# Patient Record
(demographics unavailable — no encounter records)

---

## 2025-07-25 NOTE — IMAGING
[de-identified] : RIGHT KNEE No Effusion +Medial joint line tenderness ROM 3-115 5/5 Strength NVI  LEFT KNEE No Effusion +Medial joint line tenderness ROM 3-120 5/5 Strength NVI  Non-antalgic gait w/o assistance

## 2025-07-25 NOTE — PROCEDURE
[Right] : of the right [Knee] : knee [Effusion] : effusion [de-identified] : 20cc [de-identified] : clear, straw-colored  [FreeTextEntry3] : Large joint injection was performed on the __BILATERAL_ knee. The indication for this procedure was pain, inflammation, and x-ray evidence of Osteoarthritis on this or prior visit. The site was prepped with betadine, ethyl chloride sprayed topically, and sterile technique used. An injection of Lidocaine 3cc of 1%, Bupivacaine (Marcaine) 5cc of 0.25%, Methylprednisolone (Depomedrol) cc of 80 mg was used. Patient was advised to call if redness, pain, or fever occur, apply ice for 15 minutes out of every hour for the next 12-24 hours as tolerated and patient was advised to rest the joint(s) for days. Patient has tried OTC's including aspirin, Ibuprofen, Aleve, etc. or prescription NSAIDS, and/or exercises at home and/or physical therapy without satisfactory response and patient had decreased mobility in the joint. Ultrasound guidance was indicated for this patient due to better visualize joint space. All ultrasound images have been permanently captured and stored accordingly in our picture.

## 2025-07-25 NOTE — DISCUSSION/SUMMARY
[de-identified] : The patient was advised of the diagnosis.  The natural history of the pathology was explained in full to the patient in layman's terms. All questions were answered.  The risks and benefits of surgical and non-surgical treatment alternatives were explained in full to the patient.  The natural progression of Osteoarthritis was explained to the patient.  We discussed the possible treatment options from conservative to operative.  These included NSAIDS, Glucosamine and Chondrotin sulfate, and Physical Therapy as well different types of injections.  We also discussed that at some point they may progress to needed a TKA.  Information and pamphlets were given.  The risks, benefits, contents and alternatives to injection were explained in full to the patient.  Risks outlined include but are not limited to infection, sepsis, bleeding, scarring, skin discoloration, temporary increase in pain, syncopal episode, failure to resolve symptoms, allergic reaction, flare reaction, permanent white skin discoloration, symptom recurrence, and elevation of blood sugar in diabetics.  Patient understood the risks.  All questions were answered.  After discussion of options, patient requested an injection.  Oral informed consent was obtained and sterile prep was done of the injection site.  Sterile technique was used to introduce the mixture.  Patient tolerated the procedure well.  Patient advised to ice the injection site this evening.  Signs and symptoms of infection reviewed and patient advised to call immediately for redness, fevers, and/or chills.

## 2025-07-25 NOTE — HISTORY OF PRESENT ILLNESS
[Dull/Aching] : dull/aching [de-identified] : 7/25/25: f/u [facility] knees. Having returning pain. CSIs from last year were beneficial until a few weeks ago   Previous doc: 7/2/24: 73yo M with bilateral R>L knee pain for the past ~4-6 months with no injury. Admits to some pain and difficulty with prolonged walking/standing, startup, and stairs. He has tried Aleve intermittently and compression sleeves with mild relief. No formal tx to date.      [FreeTextEntry5] : starting to swelling a bit

## 2025-07-25 NOTE — ASSESSMENT
[FreeTextEntry1] : 7/2/24: Adv bilateral R>L knee OA. Discussed anti-inflammatories, PT/HEP, CSI, visco injections, and briefly TKA. Had mild relief with Aleve. He is well informed and would like to proceed with Mobic and PT/HEP at this time. Defers injections today. f/up 6 weeks, may consider CSI  7/25/25: Returning bilateral knee pain. Had good relief with CSIs last year. Will repeat CSIs for bilateral knees today, tolerated well. f/u 6 weeks prn, may consider gel inj